# Patient Record
Sex: FEMALE | Race: WHITE | NOT HISPANIC OR LATINO | Employment: OTHER | URBAN - METROPOLITAN AREA
[De-identification: names, ages, dates, MRNs, and addresses within clinical notes are randomized per-mention and may not be internally consistent; named-entity substitution may affect disease eponyms.]

---

## 2023-06-25 DIAGNOSIS — I15.9 SECONDARY HYPERTENSION, UNSPECIFIED: ICD-10-CM

## 2023-06-26 ENCOUNTER — OFFICE VISIT (OUTPATIENT)
Dept: PRIMARY CARE | Facility: CLINIC | Age: 68
End: 2023-06-26
Payer: MEDICARE

## 2023-06-26 VITALS
WEIGHT: 161.4 LBS | BODY MASS INDEX: 30.5 KG/M2 | DIASTOLIC BLOOD PRESSURE: 80 MMHG | HEART RATE: 64 BPM | OXYGEN SATURATION: 99 % | SYSTOLIC BLOOD PRESSURE: 160 MMHG

## 2023-06-26 DIAGNOSIS — Z13.0 SCREENING FOR DEFICIENCY ANEMIA: ICD-10-CM

## 2023-06-26 DIAGNOSIS — L50.0 ALLERGIC URTICARIA: ICD-10-CM

## 2023-06-26 DIAGNOSIS — E55.9 VITAMIN D DEFICIENCY: ICD-10-CM

## 2023-06-26 DIAGNOSIS — Z23 IMMUNIZATION DUE: ICD-10-CM

## 2023-06-26 DIAGNOSIS — Z00.00 WELLNESS EXAMINATION: Primary | ICD-10-CM

## 2023-06-26 DIAGNOSIS — Z13.6 ENCOUNTER FOR SCREENING FOR CARDIOVASCULAR DISORDERS: ICD-10-CM

## 2023-06-26 DIAGNOSIS — I15.9 BENIGN SECONDARY HYPERTENSION: ICD-10-CM

## 2023-06-26 DIAGNOSIS — Z13.29 SCREENING FOR THYROID DISORDER: ICD-10-CM

## 2023-06-26 DIAGNOSIS — C02.9 TONGUE CANCER (MULTI): ICD-10-CM

## 2023-06-26 DIAGNOSIS — E03.9 ADULT HYPOTHYROIDISM: ICD-10-CM

## 2023-06-26 PROBLEM — L25.5 CONTACT DERMATITIS DUE TO PLANT: Status: ACTIVE | Noted: 2023-06-26

## 2023-06-26 PROBLEM — R06.02 EXERTIONAL SHORTNESS OF BREATH: Status: RESOLVED | Noted: 2023-06-26 | Resolved: 2023-06-26

## 2023-06-26 PROBLEM — K14.8 MASS OF TONGUE: Status: ACTIVE | Noted: 2023-06-26

## 2023-06-26 PROBLEM — K14.8 MASS OF TONGUE: Status: RESOLVED | Noted: 2023-06-26 | Resolved: 2023-06-26

## 2023-06-26 PROBLEM — R06.02 EXERTIONAL SHORTNESS OF BREATH: Status: ACTIVE | Noted: 2023-06-26

## 2023-06-26 PROBLEM — R42 LIGHTHEADEDNESS: Status: ACTIVE | Noted: 2023-06-26

## 2023-06-26 PROBLEM — R21 RASH: Status: ACTIVE | Noted: 2023-06-26

## 2023-06-26 PROBLEM — R21 RASH: Status: RESOLVED | Noted: 2023-06-26 | Resolved: 2023-06-26

## 2023-06-26 PROBLEM — R42 LIGHTHEADEDNESS: Status: RESOLVED | Noted: 2023-06-26 | Resolved: 2023-06-26

## 2023-06-26 PROBLEM — M72.2 PLANTAR FASCIITIS: Status: ACTIVE | Noted: 2023-06-26

## 2023-06-26 LAB
ALANINE AMINOTRANSFERASE (SGPT) (U/L) IN SER/PLAS: 7 U/L (ref 7–45)
ALBUMIN (G/DL) IN SER/PLAS: 4.3 G/DL (ref 3.4–5)
ALKALINE PHOSPHATASE (U/L) IN SER/PLAS: 61 U/L (ref 33–136)
ANION GAP IN SER/PLAS: 14 MMOL/L (ref 10–20)
ASPARTATE AMINOTRANSFERASE (SGOT) (U/L) IN SER/PLAS: 11 U/L (ref 9–39)
BASOPHILS (10*3/UL) IN BLOOD BY AUTOMATED COUNT: 0.03 X10E9/L (ref 0–0.1)
BASOPHILS/100 LEUKOCYTES IN BLOOD BY AUTOMATED COUNT: 0.6 % (ref 0–2)
BILIRUBIN TOTAL (MG/DL) IN SER/PLAS: 0.7 MG/DL (ref 0–1.2)
CALCIDIOL (25 OH VITAMIN D3) (NG/ML) IN SER/PLAS: 17 NG/ML
CALCIUM (MG/DL) IN SER/PLAS: 9.8 MG/DL (ref 8.6–10.6)
CARBON DIOXIDE, TOTAL (MMOL/L) IN SER/PLAS: 27 MMOL/L (ref 21–32)
CHLORIDE (MMOL/L) IN SER/PLAS: 105 MMOL/L (ref 98–107)
CHOLESTEROL (MG/DL) IN SER/PLAS: 174 MG/DL (ref 0–199)
CHOLESTEROL IN HDL (MG/DL) IN SER/PLAS: 53.3 MG/DL
CHOLESTEROL/HDL RATIO: 3.3
COBALAMIN (VITAMIN B12) (PG/ML) IN SER/PLAS: 472 PG/ML (ref 211–911)
CREATININE (MG/DL) IN SER/PLAS: 0.69 MG/DL (ref 0.5–1.05)
EOSINOPHILS (10*3/UL) IN BLOOD BY AUTOMATED COUNT: 0.27 X10E9/L (ref 0–0.7)
EOSINOPHILS/100 LEUKOCYTES IN BLOOD BY AUTOMATED COUNT: 5.7 % (ref 0–6)
ERYTHROCYTE DISTRIBUTION WIDTH (RATIO) BY AUTOMATED COUNT: 14.4 % (ref 11.5–14.5)
ERYTHROCYTE MEAN CORPUSCULAR HEMOGLOBIN CONCENTRATION (G/DL) BY AUTOMATED: 31.4 G/DL (ref 32–36)
ERYTHROCYTE MEAN CORPUSCULAR VOLUME (FL) BY AUTOMATED COUNT: 88 FL (ref 80–100)
ERYTHROCYTES (10*6/UL) IN BLOOD BY AUTOMATED COUNT: 4.95 X10E12/L (ref 4–5.2)
GFR FEMALE: >90 ML/MIN/1.73M2
GLUCOSE (MG/DL) IN SER/PLAS: 87 MG/DL (ref 74–99)
HEMATOCRIT (%) IN BLOOD BY AUTOMATED COUNT: 43.6 % (ref 36–46)
HEMOGLOBIN (G/DL) IN BLOOD: 13.7 G/DL (ref 12–16)
IMMATURE GRANULOCYTES/100 LEUKOCYTES IN BLOOD BY AUTOMATED COUNT: 0.2 % (ref 0–0.9)
LDL: 105 MG/DL (ref 0–99)
LEUKOCYTES (10*3/UL) IN BLOOD BY AUTOMATED COUNT: 4.7 X10E9/L (ref 4.4–11.3)
LYMPHOCYTES (10*3/UL) IN BLOOD BY AUTOMATED COUNT: 1.28 X10E9/L (ref 1.2–4.8)
LYMPHOCYTES/100 LEUKOCYTES IN BLOOD BY AUTOMATED COUNT: 27.1 % (ref 13–44)
MONOCYTES (10*3/UL) IN BLOOD BY AUTOMATED COUNT: 0.5 X10E9/L (ref 0.1–1)
MONOCYTES/100 LEUKOCYTES IN BLOOD BY AUTOMATED COUNT: 10.6 % (ref 2–10)
NEUTROPHILS (10*3/UL) IN BLOOD BY AUTOMATED COUNT: 2.63 X10E9/L (ref 1.2–7.7)
NEUTROPHILS/100 LEUKOCYTES IN BLOOD BY AUTOMATED COUNT: 55.8 % (ref 40–80)
NRBC (PER 100 WBCS) BY AUTOMATED COUNT: 0 /100 WBC (ref 0–0)
PLATELETS (10*3/UL) IN BLOOD AUTOMATED COUNT: 172 X10E9/L (ref 150–450)
POTASSIUM (MMOL/L) IN SER/PLAS: 4.3 MMOL/L (ref 3.5–5.3)
PROTEIN TOTAL: 6.9 G/DL (ref 6.4–8.2)
SODIUM (MMOL/L) IN SER/PLAS: 142 MMOL/L (ref 136–145)
THYROTROPIN (MIU/L) IN SER/PLAS BY DETECTION LIMIT <= 0.05 MIU/L: 0.04 MIU/L (ref 0.44–3.98)
THYROXINE (T4) FREE (NG/DL) IN SER/PLAS: 1.32 NG/DL (ref 0.78–1.48)
TRIGLYCERIDE (MG/DL) IN SER/PLAS: 78 MG/DL (ref 0–149)
UREA NITROGEN (MG/DL) IN SER/PLAS: 14 MG/DL (ref 6–23)
VLDL: 16 MG/DL (ref 0–40)

## 2023-06-26 PROCEDURE — 1170F FXNL STATUS ASSESSED: CPT | Performed by: STUDENT IN AN ORGANIZED HEALTH CARE EDUCATION/TRAINING PROGRAM

## 2023-06-26 PROCEDURE — 80061 LIPID PANEL: CPT

## 2023-06-26 PROCEDURE — 82607 VITAMIN B-12: CPT

## 2023-06-26 PROCEDURE — G0009 ADMIN PNEUMOCOCCAL VACCINE: HCPCS | Performed by: STUDENT IN AN ORGANIZED HEALTH CARE EDUCATION/TRAINING PROGRAM

## 2023-06-26 PROCEDURE — 3077F SYST BP >= 140 MM HG: CPT | Performed by: STUDENT IN AN ORGANIZED HEALTH CARE EDUCATION/TRAINING PROGRAM

## 2023-06-26 PROCEDURE — 85025 COMPLETE CBC W/AUTO DIFF WBC: CPT

## 2023-06-26 PROCEDURE — 84439 ASSAY OF FREE THYROXINE: CPT

## 2023-06-26 PROCEDURE — 1159F MED LIST DOCD IN RCRD: CPT | Performed by: STUDENT IN AN ORGANIZED HEALTH CARE EDUCATION/TRAINING PROGRAM

## 2023-06-26 PROCEDURE — 1160F RVW MEDS BY RX/DR IN RCRD: CPT | Performed by: STUDENT IN AN ORGANIZED HEALTH CARE EDUCATION/TRAINING PROGRAM

## 2023-06-26 PROCEDURE — 82306 VITAMIN D 25 HYDROXY: CPT

## 2023-06-26 PROCEDURE — 1036F TOBACCO NON-USER: CPT | Performed by: STUDENT IN AN ORGANIZED HEALTH CARE EDUCATION/TRAINING PROGRAM

## 2023-06-26 PROCEDURE — 99214 OFFICE O/P EST MOD 30 MIN: CPT | Performed by: STUDENT IN AN ORGANIZED HEALTH CARE EDUCATION/TRAINING PROGRAM

## 2023-06-26 PROCEDURE — 80053 COMPREHEN METABOLIC PANEL: CPT

## 2023-06-26 PROCEDURE — 90677 PCV20 VACCINE IM: CPT | Performed by: STUDENT IN AN ORGANIZED HEALTH CARE EDUCATION/TRAINING PROGRAM

## 2023-06-26 PROCEDURE — G0439 PPPS, SUBSEQ VISIT: HCPCS | Performed by: STUDENT IN AN ORGANIZED HEALTH CARE EDUCATION/TRAINING PROGRAM

## 2023-06-26 PROCEDURE — 3079F DIAST BP 80-89 MM HG: CPT | Performed by: STUDENT IN AN ORGANIZED HEALTH CARE EDUCATION/TRAINING PROGRAM

## 2023-06-26 PROCEDURE — 84443 ASSAY THYROID STIM HORMONE: CPT

## 2023-06-26 RX ORDER — METHYLPREDNISOLONE 4 MG/1
TABLET ORAL
COMMUNITY
Start: 2022-07-06 | End: 2023-06-26 | Stop reason: ALTCHOICE

## 2023-06-26 RX ORDER — LEVOTHYROXINE SODIUM 100 UG/1
100 TABLET ORAL DAILY
Qty: 90 TABLET | Refills: 3 | Status: SHIPPED | OUTPATIENT
Start: 2023-06-26 | End: 2024-06-25

## 2023-06-26 RX ORDER — VALSARTAN 160 MG/1
1 TABLET ORAL DAILY
COMMUNITY
Start: 2022-07-06 | End: 2023-06-26 | Stop reason: ALTCHOICE

## 2023-06-26 RX ORDER — VALSARTAN 160 MG/1
TABLET ORAL
Qty: 90 TABLET | Refills: 1 | Status: SHIPPED | OUTPATIENT
Start: 2023-06-26 | End: 2023-06-26 | Stop reason: ALTCHOICE

## 2023-06-26 RX ORDER — PREDNISONE 10 MG/1
TABLET ORAL
COMMUNITY
Start: 2023-06-01 | End: 2023-06-26 | Stop reason: ALTCHOICE

## 2023-06-26 RX ORDER — VALSARTAN AND HYDROCHLOROTHIAZIDE 160; 12.5 MG/1; MG/1
1 TABLET, FILM COATED ORAL DAILY
Qty: 90 TABLET | Refills: 3 | Status: SHIPPED | OUTPATIENT
Start: 2023-06-26 | End: 2024-06-25

## 2023-06-26 RX ORDER — LEVOTHYROXINE SODIUM 100 UG/1
100 TABLET ORAL DAILY
COMMUNITY
End: 2023-06-26 | Stop reason: SDUPTHER

## 2023-06-26 RX ORDER — SODIUM CHLORIDE 0.9 G/100ML
1 IRRIGANT IRRIGATION
COMMUNITY
Start: 2021-06-02 | End: 2023-06-26 | Stop reason: ALTCHOICE

## 2023-06-26 ASSESSMENT — PATIENT HEALTH QUESTIONNAIRE - PHQ9
SUM OF ALL RESPONSES TO PHQ9 QUESTIONS 1 AND 2: 0
2. FEELING DOWN, DEPRESSED OR HOPELESS: NOT AT ALL
1. LITTLE INTEREST OR PLEASURE IN DOING THINGS: NOT AT ALL

## 2023-06-26 ASSESSMENT — ACTIVITIES OF DAILY LIVING (ADL)
MANAGING_FINANCES: INDEPENDENT
TAKING_MEDICATION: INDEPENDENT
BATHING: INDEPENDENT
GROCERY_SHOPPING: INDEPENDENT
DRESSING: INDEPENDENT
DOING_HOUSEWORK: INDEPENDENT

## 2023-06-26 ASSESSMENT — ENCOUNTER SYMPTOMS
LOSS OF SENSATION IN FEET: 0
OCCASIONAL FEELINGS OF UNSTEADINESS: 0
DEPRESSION: 0

## 2023-06-26 ASSESSMENT — PAIN SCALES - GENERAL: PAINLEVEL: 0-NO PAIN

## 2023-06-26 NOTE — PATIENT INSTRUCTIONS
1.  Medicare wellness visit.  No concerns on exam.  Screening labs ordered today.  We will repeat colonoscopy and mammogram in 2024.  Prevnar 20 given today, no further pneumonia shots needed.    2.  Hypertension.  She has had elevated readings at home.  We will adjust medication to valsartan/hydrochlorothiazide 160/12.5 mg once per day continue to monitor home blood pressures.  Notify us in the next 2 to 3 months if your average readings are above 140/90.    3.  Hypothyroidism repeat labs today continue current dose we will adjust if needed based on lab work.    4.  History of tongue cancer.  Continue routine follow-ups with ENT specialist every 4 months.

## 2023-06-26 NOTE — PROGRESS NOTES
Subjective   Patient ID: Kalli Pandey is a 68 y.o. female who presents for Medicare Annual Wellness Visit Initial (She is fasting.) and Med Refill.    HPI comes in for Medicare wellness visit    Review of Systems  Constitutional: NO F, chills, or sweats  Eyes: no blurred vision or visual disturbance  ENT: no hearing loss, no congestion, no nasal discharge, no hoarseness and no sore throat.   Cardiovascular: no chest pain, no edema, no palps and no syncope.,  Blood pressure readings are elevated at home  Respiratory: no cough,no s.o.b. and no wheezing  Gastrointestinal: no abdominal pain, No C/D no N/V, no blood in stools  Genitourinary: no dysuria, no change in urinary frequency, no urinary hesitancy and no feelings of urinary urgency.   Musculoskeletal: no arthralgias,  no back pain and no myalgias.   Integumentary: no new skin lesions and no rashes.   Neurological: no difficulty walking, no headache, no limb weakness, no numbness and no tingling.   Psychiatric: no anxiety, no depression, no anhedonia and no substance use disorders.   Endocrine: no recent weight gain and no recent weight loss.   Hematologic/Lymphatic: no tendency for easy bruising and no swollen glands.  Objective   /80 (BP Location: Right arm, Patient Position: Sitting, BP Cuff Size: Large adult)   Pulse 64   Wt 73.2 kg (161 lb 6.4 oz)   SpO2 99%   BMI 30.50 kg/m²     Physical Exam  gen- a & o x 3, nad, pleasant  heent- eomi, perrla, ear canals patent, TM's non-erythematous, no fluid, frontal and maxillary sinus's nontender  neck- supple, nontender, no palpable or enlarged nodes, no thyromegaly  heart- rrr, no murmurs  lungs- cta b/l , no w/r/r  chest- symmetric, nontender  ab- soft, nontender, no palpable organomegaly, postive bowel sounds  ex's- no c/c/e  neuro- CNs 2-12 grossly intact, full sensation and strength in all extremities    Assessment/Plan     1.  Medicare wellness visit.  No concerns on exam.  Screening labs ordered  today.  We will repeat colonoscopy and mammogram in 2024.  Prevnar 20 given today, no further pneumonia shots needed.    2.  Hypertension.  She has had elevated readings at home.  We will adjust medication to valsartan/hydrochlorothiazide 160/12.5 mg once per day continue to monitor home blood pressures.  Notify us in the next 2 to 3 months if your average readings are above 140/90.    3.  Hypothyroidism repeat labs today continue current dose we will adjust if needed based on lab work.    4.  History of tongue cancer.  Continue routine follow-ups with ENT specialist every 4 months.

## 2023-12-11 ENCOUNTER — OFFICE VISIT (OUTPATIENT)
Dept: OTOLARYNGOLOGY | Facility: CLINIC | Age: 68
End: 2023-12-11
Payer: MEDICARE

## 2023-12-11 VITALS
HEART RATE: 85 BPM | BODY MASS INDEX: 29.83 KG/M2 | WEIGHT: 158 LBS | OXYGEN SATURATION: 95 % | HEIGHT: 61 IN | DIASTOLIC BLOOD PRESSURE: 86 MMHG | TEMPERATURE: 97.3 F | RESPIRATION RATE: 16 BRPM | SYSTOLIC BLOOD PRESSURE: 139 MMHG

## 2023-12-11 DIAGNOSIS — C02.9 TONGUE CANCER (MULTI): Primary | ICD-10-CM

## 2023-12-11 PROCEDURE — 88305 TISSUE EXAM BY PATHOLOGIST: CPT | Mod: TC,SUR | Performed by: OTOLARYNGOLOGY

## 2023-12-11 PROCEDURE — 1160F RVW MEDS BY RX/DR IN RCRD: CPT | Performed by: OTOLARYNGOLOGY

## 2023-12-11 PROCEDURE — 88312 SPECIAL STAINS GROUP 1: CPT | Performed by: DENTIST

## 2023-12-11 PROCEDURE — 1159F MED LIST DOCD IN RCRD: CPT | Performed by: OTOLARYNGOLOGY

## 2023-12-11 PROCEDURE — 88305 TISSUE EXAM BY PATHOLOGIST: CPT | Performed by: DENTIST

## 2023-12-11 PROCEDURE — 41100 BIOPSY OF TONGUE: CPT | Performed by: OTOLARYNGOLOGY

## 2023-12-11 PROCEDURE — 99213 OFFICE O/P EST LOW 20 MIN: CPT | Performed by: OTOLARYNGOLOGY

## 2023-12-11 PROCEDURE — 3075F SYST BP GE 130 - 139MM HG: CPT | Performed by: OTOLARYNGOLOGY

## 2023-12-11 PROCEDURE — 1036F TOBACCO NON-USER: CPT | Performed by: OTOLARYNGOLOGY

## 2023-12-11 PROCEDURE — 3079F DIAST BP 80-89 MM HG: CPT | Performed by: OTOLARYNGOLOGY

## 2023-12-11 PROCEDURE — 1126F AMNT PAIN NOTED NONE PRSNT: CPT | Performed by: OTOLARYNGOLOGY

## 2023-12-11 ASSESSMENT — PAIN SCALES - GENERAL: PAINLEVEL: 0-NO PAIN

## 2023-12-11 NOTE — PROGRESS NOTES
Otolaryngology Head and Neck Surgery Clinic Follow-up Note    CC: tongue cancer    Subjective:  Kalli Pandey is a 68 y.o. female who presents to clinic today for follow up of her [id number] left posterolateral tongue scca.     path - 3 cm, 16 mm DOI, negative margin, closest at 8 mm deep; 2 lymph nodes pos without ECS  completed adjuvant radiation.        66 yo woman with 3 month history of sore on the left side of the tongue. She first noticed it back in February. There's some discomfort there and she has to avoid it while eating. No bleeding, doesn't report gaviota pain. Seems to be persistent / enlarged since then. She never recalled a non healing ulcer or sore in that spot. denies smoking or drinking. No heart problems, no blood thinners.     5-12-21 FU: had CT scan, PAT today; no new complaints  5-19-21: OR s/p triple, trach, left hemiglossectomy, left neck dissection 1-4, recon with left radial forearm free flap.  6-8-21 FU: doing well at home, breathing comfortably, occasional blood tinged mucous and scabs with suctioning. minimal pain  6-22-21 FU: doing well with capped trach during day, feels a little tight at night. tolerating tube feeds, no shortness of breath, no mucous plugs, no significant pain, tongue swelling improved  7-13-21 fu: has had trach capped for past 2 weeks, no issues. to start radiation tomorrow with Dr. Pitt  9-14-21 FU: completed radiation 8/26. pain and mucositis finally starting to resolve. tolerating some food now but still using feeding tube. no trouble breathing.  10-19-21 FU: no longer using feeding tube, no more pain  11-15-21 FU: sometimes has some burning when drinking otherwise doing well  1-4-22 FU: no new complaints  3-1-22 FU: no new complaints  5-3-22 FU: no new complaints  8-9-22 FU: no complaints  11-29-22 FU: no issues  4-4-23 FU: no new complaints  8-28-23 FU: had lower teeth removed, has full lower dentures now, no trouble eating/drinking. denture seems to be  "seating well   12-11-23 FU:  no new complaints.           Physical Exam:  Vitals: /86   Pulse 85   Temp 36.3 °C (97.3 °F) (Temporal)   Resp 16   Ht 1.549 m (5' 1\")   Wt 71.7 kg (158 lb)   SpO2 95%   BMI 29.85 kg/m²   GENERAL: Appears well developed, well nourished  RESPIRATION: Breathing comfortably on room air, no stridor  CV: No clubbing/cyanosis/edema in hands  EYES: EOM Intact, sclera normal  NEURO: AAOx3, Cranial nerves 2-12 intact and symmetric bilaterally  HEAD AND FACE: Skin with no masses or lesions, sinuses nontender to palpation  SALIVARY GLANDS: Parotid and submandibular glands normal bilaterally  EARS: Normal external ears, external auditory canals, and TMs to otoscopy, normal hearing to whispered voice  NOSE: External nose midline, anterior rhinoscopy is normal with limited visualization to the anterior aspect of the interior turbinates, no lesions noted  ORAL CAVITY/OROPHARYNX/LIPS: Normal mucous membranes, normal floor of mouth/tongue/OP, papular/warty appearing lesions of the left posterior lateral tongue near the reconstruction.  PHARYNGEAL WALLS AND NASOPHARYNX: No masses noted  NECK/LYMPH: No LAD, no thyroid masses      Procedure note: Intraoral biopsy  Verbal consent was obtained from the patient. 1cc of 1% lidocaine with epineprhine was infiltrated in the left posterior lateral tongue.  15 blade was used to excise a small piece of the lesion.  This was then placed in formalin and sent for permanent pathology.  Hemostasis was achieved with pressure and silver nitrate cautery.  Patient tolerated procedure well.    Labs:  TSH/T4 (6/26/23)  TSH 0.04 (low), free T4 1.32 (normal)    Assessment and Plan:  Kalli Pandey is a 68 y.o. female who presents to ENT clinic today for cancer surveillance of her [id number] Scca of the posteriorlateral tongue.    - will call for biopsy results  - RTC in 3 months, will order chest xr at that time for surveillance     Mic Tellez MD " PGY5  Otolaryngology - Head & Neck Surgery  [facility]  ENT Consult Pager: [phone]; Head and Neck team [phone]

## 2023-12-18 LAB
LABORATORY COMMENT REPORT: NORMAL
PATH REPORT.FINAL DX SPEC: NORMAL
PATH REPORT.GROSS SPEC: NORMAL
PATH REPORT.RELEVANT HX SPEC: NORMAL
PATH REPORT.TOTAL CANCER: NORMAL

## 2023-12-18 NOTE — RESULT ENCOUNTER NOTE
Negative biopsy, no cancer. Some fungal elements and thickening  Cornelius can you send over some nystvalencia davidson and spit for her thanks.

## 2023-12-20 DIAGNOSIS — B37.0 THRUSH: Primary | ICD-10-CM

## 2023-12-20 RX ORDER — NYSTATIN 100000 [USP'U]/ML
500000 SUSPENSION ORAL
Qty: 150 ML | Refills: 0 | Status: SHIPPED | OUTPATIENT
Start: 2023-12-20 | End: 2023-12-30

## 2024-03-11 ENCOUNTER — OFFICE VISIT (OUTPATIENT)
Dept: OTOLARYNGOLOGY | Facility: CLINIC | Age: 69
End: 2024-03-11
Payer: MEDICARE

## 2024-03-11 VITALS
TEMPERATURE: 98.1 F | DIASTOLIC BLOOD PRESSURE: 77 MMHG | SYSTOLIC BLOOD PRESSURE: 122 MMHG | WEIGHT: 163 LBS | RESPIRATION RATE: 16 BRPM | HEIGHT: 61 IN | HEART RATE: 70 BPM | OXYGEN SATURATION: 99 % | BODY MASS INDEX: 30.78 KG/M2

## 2024-03-11 DIAGNOSIS — C02.9 TONGUE CANCER (MULTI): Primary | ICD-10-CM

## 2024-03-11 PROCEDURE — 3078F DIAST BP <80 MM HG: CPT | Performed by: OTOLARYNGOLOGY

## 2024-03-11 PROCEDURE — 99213 OFFICE O/P EST LOW 20 MIN: CPT | Performed by: OTOLARYNGOLOGY

## 2024-03-11 PROCEDURE — 1126F AMNT PAIN NOTED NONE PRSNT: CPT | Performed by: OTOLARYNGOLOGY

## 2024-03-11 PROCEDURE — 3074F SYST BP LT 130 MM HG: CPT | Performed by: OTOLARYNGOLOGY

## 2024-03-11 PROCEDURE — 1159F MED LIST DOCD IN RCRD: CPT | Performed by: OTOLARYNGOLOGY

## 2024-03-11 PROCEDURE — 1036F TOBACCO NON-USER: CPT | Performed by: OTOLARYNGOLOGY

## 2024-03-11 ASSESSMENT — PAIN SCALES - GENERAL: PAINLEVEL: 0-NO PAIN

## 2024-03-11 NOTE — PROGRESS NOTES
Otolaryngology Head and Neck Surgery Clinic Follow-up Note    CC: tongue cancer    Subjective:  Kalli Pandey is a 68 y.o. female who presents to clinic today for follow up of her [id number] left posterolateral tongue scca.     path - 3 cm, 16 mm DOI, negative margin, closest at 8 mm deep; 2 lymph nodes pos without ECS  completed adjuvant radiation.        64 yo woman with 3 month history of sore on the left side of the tongue. She first noticed it back in February. There's some discomfort there and she has to avoid it while eating. No bleeding, doesn't report gaviota pain. Seems to be persistent / enlarged since then. She never recalled a non healing ulcer or sore in that spot. denies smoking or drinking. No heart problems, no blood thinners.     5-12-21 FU: had CT scan, PAT today; no new complaints  5-19-21: OR s/p triple, trach, left hemiglossectomy, left neck dissection 1-4, recon with left radial forearm free flap.  6-8-21 FU: doing well at home, breathing comfortably, occasional blood tinged mucous and scabs with suctioning. minimal pain  6-22-21 FU: doing well with capped trach during day, feels a little tight at night. tolerating tube feeds, no shortness of breath, no mucous plugs, no significant pain, tongue swelling improved  7-13-21 fu: has had trach capped for past 2 weeks, no issues. to start radiation tomorrow with Dr. Pitt  9-14-21 FU: completed radiation 8/26. pain and mucositis finally starting to resolve. tolerating some food now but still using feeding tube. no trouble breathing.  10-19-21 FU: no longer using feeding tube, no more pain  11-15-21 FU: sometimes has some burning when drinking otherwise doing well  1-4-22 FU: no new complaints  3-1-22 FU: no new complaints  5-3-22 FU: no new complaints  8-9-22 FU: no complaints  11-29-22 FU: no issues  4-4-23 FU: no new complaints  8-28-23 FU: had lower teeth removed, has full lower dentures now, no trouble eating/drinking. denture seems to be  "seating well   12-11-23 FU:  no new complaints.   3-11-24 FU: no new complaints    Physical Exam:  Vitals: /77   Pulse 70   Temp 36.7 °C (98.1 °F) (Temporal)   Resp 16   Ht 1.549 m (5' 1\")   Wt 73.9 kg (163 lb)   SpO2 99%   BMI 30.80 kg/m²   GENERAL: Appears well developed, well nourished  RESPIRATION: Breathing comfortably on room air, no stridor  CV: No clubbing/cyanosis/edema in hands  EYES: EOM Intact, sclera normal  NEURO: AAOx3, Cranial nerves 2-12 intact and symmetric bilaterally  HEAD AND FACE: Skin with no masses or lesions, sinuses nontender to palpation  SALIVARY GLANDS: Parotid and submandibular glands normal bilaterally  EARS: Normal external ears, external auditory canals, and TMs to otoscopy, normal hearing to whispered voice  NOSE: External nose midline, anterior rhinoscopy is normal with limited visualization to the anterior aspect of the interior turbinates, no lesions noted  ORAL CAVITY/OROPHARYNX/LIPS: Normal mucous membranes, normal floor of mouth/tongue/OP, papular/warty appearing lesions of the left posterior lateral tongue/ RMT where posterior aspect of dentures is rubbing  PHARYNGEAL WALLS AND NASOPHARYNX: No masses noted  NECK/LYMPH: No LAD, no thyroid masses    Labs:  TSH/T4 (6/26/23)  TSH 0.04 (low), free T4 1.32 (normal)    Assessment and Plan:  Kalli Pandey is a 68 y.o. female who presents to ENT clinic today for cancer surveillance of her [id number] Scca of the posteriorlateral tongue.    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

## 2024-04-10 ENCOUNTER — HOSPITAL ENCOUNTER (OUTPATIENT)
Dept: RADIOLOGY | Facility: HOSPITAL | Age: 69
Discharge: HOME | End: 2024-04-10
Payer: MEDICARE

## 2024-04-10 ENCOUNTER — APPOINTMENT (OUTPATIENT)
Dept: RADIOLOGY | Facility: CLINIC | Age: 69
End: 2024-04-10
Payer: MEDICARE

## 2024-04-10 DIAGNOSIS — C02.9 TONGUE CANCER (MULTI): ICD-10-CM

## 2024-04-10 PROCEDURE — 71046 X-RAY EXAM CHEST 2 VIEWS: CPT

## 2024-04-10 PROCEDURE — 71046 X-RAY EXAM CHEST 2 VIEWS: CPT | Performed by: RADIOLOGY

## 2024-06-13 DIAGNOSIS — I15.9 BENIGN SECONDARY HYPERTENSION: ICD-10-CM

## 2024-06-13 DIAGNOSIS — E03.9 ADULT HYPOTHYROIDISM: ICD-10-CM

## 2024-06-13 PROBLEM — I95.9 HYPOTENSION: Status: ACTIVE | Noted: 2024-06-13

## 2024-06-13 PROBLEM — R09.02 HYPOXIA: Status: ACTIVE | Noted: 2024-06-13

## 2024-06-13 PROBLEM — J98.11 PULMONARY ATELECTASIS: Status: ACTIVE | Noted: 2024-06-13

## 2024-06-13 PROBLEM — E86.1 ABSOLUTE HYPOVOLEMIA: Status: ACTIVE | Noted: 2024-06-13

## 2024-06-13 RX ORDER — VALSARTAN AND HYDROCHLOROTHIAZIDE 160; 12.5 MG/1; MG/1
1 TABLET, FILM COATED ORAL DAILY
Qty: 90 TABLET | Refills: 3 | Status: SHIPPED | OUTPATIENT
Start: 2024-06-13

## 2024-06-13 RX ORDER — AMLODIPINE, VALSARTAN AND HYDROCHLOROTHIAZIDE 5; 160; 25 MG/1; MG/1; MG/1
TABLET ORAL
COMMUNITY
Start: 2016-03-24

## 2024-06-13 RX ORDER — LEVOTHYROXINE SODIUM 100 UG/1
100 TABLET ORAL DAILY
Qty: 90 TABLET | Refills: 3 | Status: SHIPPED | OUTPATIENT
Start: 2024-06-13

## 2024-07-15 ENCOUNTER — APPOINTMENT (OUTPATIENT)
Dept: OTOLARYNGOLOGY | Facility: CLINIC | Age: 69
End: 2024-07-15
Payer: MEDICARE

## 2024-07-22 ENCOUNTER — APPOINTMENT (OUTPATIENT)
Dept: OTOLARYNGOLOGY | Facility: CLINIC | Age: 69
End: 2024-07-22
Payer: MEDICARE

## 2024-07-22 VITALS
BODY MASS INDEX: 30.8 KG/M2 | RESPIRATION RATE: 16 BRPM | SYSTOLIC BLOOD PRESSURE: 142 MMHG | HEART RATE: 67 BPM | TEMPERATURE: 97.9 F | WEIGHT: 163.13 LBS | OXYGEN SATURATION: 100 % | HEIGHT: 61 IN | DIASTOLIC BLOOD PRESSURE: 82 MMHG

## 2024-07-22 DIAGNOSIS — Z85.810 HISTORY OF TONGUE CANCER: Primary | ICD-10-CM

## 2024-07-22 PROCEDURE — 99213 OFFICE O/P EST LOW 20 MIN: CPT | Performed by: OTOLARYNGOLOGY

## 2024-07-22 PROCEDURE — 1126F AMNT PAIN NOTED NONE PRSNT: CPT | Performed by: OTOLARYNGOLOGY

## 2024-07-22 PROCEDURE — 3077F SYST BP >= 140 MM HG: CPT | Performed by: OTOLARYNGOLOGY

## 2024-07-22 PROCEDURE — 1159F MED LIST DOCD IN RCRD: CPT | Performed by: OTOLARYNGOLOGY

## 2024-07-22 PROCEDURE — 3008F BODY MASS INDEX DOCD: CPT | Performed by: OTOLARYNGOLOGY

## 2024-07-22 PROCEDURE — 3079F DIAST BP 80-89 MM HG: CPT | Performed by: OTOLARYNGOLOGY

## 2024-07-22 PROCEDURE — 1160F RVW MEDS BY RX/DR IN RCRD: CPT | Performed by: OTOLARYNGOLOGY

## 2024-07-22 PROCEDURE — 1036F TOBACCO NON-USER: CPT | Performed by: OTOLARYNGOLOGY

## 2024-07-22 SDOH — ECONOMIC STABILITY: FOOD INSECURITY: WITHIN THE PAST 12 MONTHS, YOU WORRIED THAT YOUR FOOD WOULD RUN OUT BEFORE YOU GOT MONEY TO BUY MORE.: NEVER TRUE

## 2024-07-22 SDOH — ECONOMIC STABILITY: FOOD INSECURITY: WITHIN THE PAST 12 MONTHS, THE FOOD YOU BOUGHT JUST DIDN'T LAST AND YOU DIDN'T HAVE MONEY TO GET MORE.: NEVER TRUE

## 2024-07-22 ASSESSMENT — ENCOUNTER SYMPTOMS
OCCASIONAL FEELINGS OF UNSTEADINESS: 0
DEPRESSION: 0
LOSS OF SENSATION IN FEET: 0

## 2024-07-22 ASSESSMENT — PATIENT HEALTH QUESTIONNAIRE - PHQ9
1. LITTLE INTEREST OR PLEASURE IN DOING THINGS: NOT AT ALL
SUM OF ALL RESPONSES TO PHQ9 QUESTIONS 1 AND 2: 0
2. FEELING DOWN, DEPRESSED OR HOPELESS: NOT AT ALL

## 2024-07-22 ASSESSMENT — LIFESTYLE VARIABLES
HOW OFTEN DO YOU HAVE A DRINK CONTAINING ALCOHOL: NEVER
HOW OFTEN DO YOU HAVE SIX OR MORE DRINKS ON ONE OCCASION: NEVER
AUDIT-C TOTAL SCORE: 0
SKIP TO QUESTIONS 9-10: 1
HOW MANY STANDARD DRINKS CONTAINING ALCOHOL DO YOU HAVE ON A TYPICAL DAY: PATIENT DOES NOT DRINK

## 2024-07-22 ASSESSMENT — PAIN SCALES - GENERAL: PAINLEVEL: 0-NO PAIN

## 2024-07-22 NOTE — PROGRESS NOTES
Otolaryngology Head and Neck Surgery Clinic Follow-up Note    CC: tongue cancer    Subjective:  Kalli Pandey is a 69 y.o. female who presents to clinic today for follow up of her [id number] left posterolateral tongue scca.     path - 3 cm, 16 mm DOI, negative margin, closest at 8 mm deep; 2 lymph nodes pos without ECS  completed adjuvant radiation.        64 yo woman with 3 month history of sore on the left side of the tongue. She first noticed it back in February. There's some discomfort there and she has to avoid it while eating. No bleeding, doesn't report gaviota pain. Seems to be persistent / enlarged since then. She never recalled a non healing ulcer or sore in that spot. denies smoking or drinking. No heart problems, no blood thinners.     5-12-21 FU: had CT scan, PAT today; no new complaints  5-19-21: OR s/p triple, trach, left hemiglossectomy, left neck dissection 1-4, recon with left radial forearm free flap.  6-8-21 FU: doing well at home, breathing comfortably, occasional blood tinged mucous and scabs with suctioning. minimal pain  6-22-21 FU: doing well with capped trach during day, feels a little tight at night. tolerating tube feeds, no shortness of breath, no mucous plugs, no significant pain, tongue swelling improved  7-13-21 fu: has had trach capped for past 2 weeks, no issues. to start radiation tomorrow with Dr. Pitt  9-14-21 FU: completed radiation 8/26. pain and mucositis finally starting to resolve. tolerating some food now but still using feeding tube. no trouble breathing.  10-19-21 FU: no longer using feeding tube, no more pain  11-15-21 FU: sometimes has some burning when drinking otherwise doing well  1-4-22 FU: no new complaints  3-1-22 FU: no new complaints  5-3-22 FU: no new complaints  8-9-22 FU: no complaints  11-29-22 FU: no issues  4-4-23 FU: no new complaints  8-28-23 FU: had lower teeth removed, has full lower dentures now, no trouble eating/drinking. denture seems to be  "seating well   12-11-23 FU:  no new complaints.   3-11-24 FU: no new complaints  7-22-24 FU: no new complaints    Physical Exam:  Vitals: /82   Pulse 67   Temp 36.6 °C (97.9 °F) (Temporal)   Resp 16   Ht 1.549 m (5' 1\")   Wt 74 kg (163 lb 2 oz)   SpO2 100%   BMI 30.82 kg/m²   GENERAL: Appears well developed, well nourished  RESPIRATION: Breathing comfortably on room air, no stridor  CV: No clubbing/cyanosis/edema in hands  EYES: EOM Intact, sclera normal  NEURO: AAOx3, Cranial nerves 2-12 intact and symmetric bilaterally  HEAD AND FACE: Skin with no masses or lesions, sinuses nontender to palpation  SALIVARY GLANDS: Parotid and submandibular glands normal bilaterally  EARS: Normal external ears, external auditory canals, and TMs to otoscopy, normal hearing to whispered voice  NOSE: External nose midline, anterior rhinoscopy is normal with limited visualization to the anterior aspect of the interior turbinates, no lesions noted  ORAL CAVITY/OROPHARYNX/LIPS: Normal mucous membranes, normal floor of mouth/tongue/OP, papular/warty appearing lesions of the left posterior lateral tongue/ RMT where posterior aspect of dentures is rubbing; stable, previously biopsied  PHARYNGEAL WALLS AND NASOPHARYNX: No masses noted  NECK/LYMPH: No LAD, no thyroid masses    Labs:  TSH/T4 (6/26/23)  TSH 0.04 (low), free T4 1.32 (normal)    Assessment and Plan:  Kalli Pandey is a 69 y.o. female who presents to ENT clinic today for cancer surveillance of her [id number] Scca of the posteriorlateral tongue.  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

## 2024-09-30 ENCOUNTER — APPOINTMENT (OUTPATIENT)
Dept: PRIMARY CARE | Facility: CLINIC | Age: 69
End: 2024-09-30
Payer: MEDICARE

## 2024-10-01 DIAGNOSIS — Z12.31 ENCOUNTER FOR SCREENING MAMMOGRAM FOR BREAST CANCER: ICD-10-CM

## 2025-01-02 ENCOUNTER — APPOINTMENT (OUTPATIENT)
Dept: PRIMARY CARE | Facility: CLINIC | Age: 70
End: 2025-01-02
Payer: MEDICARE

## 2025-01-02 ENCOUNTER — LAB (OUTPATIENT)
Dept: LAB | Facility: LAB | Age: 70
End: 2025-01-02
Payer: MEDICARE

## 2025-01-02 VITALS
OXYGEN SATURATION: 94 % | WEIGHT: 164 LBS | HEIGHT: 61 IN | DIASTOLIC BLOOD PRESSURE: 82 MMHG | HEART RATE: 71 BPM | SYSTOLIC BLOOD PRESSURE: 138 MMHG | BODY MASS INDEX: 30.96 KG/M2

## 2025-01-02 DIAGNOSIS — E55.9 VITAMIN D DEFICIENCY: ICD-10-CM

## 2025-01-02 DIAGNOSIS — E03.9 ADULT HYPOTHYROIDISM: ICD-10-CM

## 2025-01-02 DIAGNOSIS — I15.9 BENIGN SECONDARY HYPERTENSION: ICD-10-CM

## 2025-01-02 DIAGNOSIS — Z78.0 MENOPAUSE: ICD-10-CM

## 2025-01-02 DIAGNOSIS — Z00.00 ROUTINE GENERAL MEDICAL EXAMINATION AT HEALTH CARE FACILITY: Primary | ICD-10-CM

## 2025-01-02 PROBLEM — L50.0 ALLERGIC URTICARIA: Status: RESOLVED | Noted: 2023-06-26 | Resolved: 2025-01-02

## 2025-01-02 PROBLEM — E86.1 ABSOLUTE HYPOVOLEMIA: Status: RESOLVED | Noted: 2024-06-13 | Resolved: 2025-01-02

## 2025-01-02 PROBLEM — L25.5 CONTACT DERMATITIS DUE TO PLANT: Status: RESOLVED | Noted: 2023-06-26 | Resolved: 2025-01-02

## 2025-01-02 PROBLEM — I95.9 HYPOTENSION: Status: RESOLVED | Noted: 2024-06-13 | Resolved: 2025-01-02

## 2025-01-02 PROBLEM — M72.2 PLANTAR FASCIITIS: Status: RESOLVED | Noted: 2023-06-26 | Resolved: 2025-01-02

## 2025-01-02 PROBLEM — R09.02 HYPOXIA: Status: RESOLVED | Noted: 2024-06-13 | Resolved: 2025-01-02

## 2025-01-02 PROBLEM — J98.11 PULMONARY ATELECTASIS: Status: RESOLVED | Noted: 2024-06-13 | Resolved: 2025-01-02

## 2025-01-02 LAB
25(OH)D3 SERPL-MCNC: 15 NG/ML (ref 30–100)
ANION GAP SERPL CALC-SCNC: 11 MMOL/L (ref 10–20)
BUN SERPL-MCNC: 16 MG/DL (ref 6–23)
CALCIUM SERPL-MCNC: 10 MG/DL (ref 8.6–10.3)
CHLORIDE SERPL-SCNC: 100 MMOL/L (ref 98–107)
CO2 SERPL-SCNC: 32 MMOL/L (ref 21–32)
CREAT SERPL-MCNC: 0.78 MG/DL (ref 0.5–1.05)
EGFRCR SERPLBLD CKD-EPI 2021: 83 ML/MIN/1.73M*2
ERYTHROCYTE [DISTWIDTH] IN BLOOD BY AUTOMATED COUNT: 12.9 % (ref 11.5–14.5)
GLUCOSE SERPL-MCNC: 80 MG/DL (ref 74–99)
HCT VFR BLD AUTO: 43.2 % (ref 36–46)
HGB BLD-MCNC: 13.9 G/DL (ref 12–16)
MCH RBC QN AUTO: 27.4 PG (ref 26–34)
MCHC RBC AUTO-ENTMCNC: 32.2 G/DL (ref 32–36)
MCV RBC AUTO: 85 FL (ref 80–100)
NRBC BLD-RTO: 0 /100 WBCS (ref 0–0)
PLATELET # BLD AUTO: 225 X10*3/UL (ref 150–450)
POTASSIUM SERPL-SCNC: 3.7 MMOL/L (ref 3.5–5.3)
RBC # BLD AUTO: 5.07 X10*6/UL (ref 4–5.2)
SODIUM SERPL-SCNC: 139 MMOL/L (ref 136–145)
T4 FREE SERPL-MCNC: 1.19 NG/DL (ref 0.61–1.12)
TSH SERPL-ACNC: 0.03 MIU/L (ref 0.44–3.98)
WBC # BLD AUTO: 6.3 X10*3/UL (ref 4.4–11.3)

## 2025-01-02 PROCEDURE — G0439 PPPS, SUBSEQ VISIT: HCPCS | Performed by: FAMILY MEDICINE

## 2025-01-02 PROCEDURE — 85027 COMPLETE CBC AUTOMATED: CPT

## 2025-01-02 PROCEDURE — 1036F TOBACCO NON-USER: CPT | Performed by: FAMILY MEDICINE

## 2025-01-02 PROCEDURE — 1159F MED LIST DOCD IN RCRD: CPT | Performed by: FAMILY MEDICINE

## 2025-01-02 PROCEDURE — 3079F DIAST BP 80-89 MM HG: CPT | Performed by: FAMILY MEDICINE

## 2025-01-02 PROCEDURE — 80048 BASIC METABOLIC PNL TOTAL CA: CPT

## 2025-01-02 PROCEDURE — 1160F RVW MEDS BY RX/DR IN RCRD: CPT | Performed by: FAMILY MEDICINE

## 2025-01-02 PROCEDURE — 1158F ADVNC CARE PLAN TLK DOCD: CPT | Performed by: FAMILY MEDICINE

## 2025-01-02 PROCEDURE — 84443 ASSAY THYROID STIM HORMONE: CPT

## 2025-01-02 PROCEDURE — 1170F FXNL STATUS ASSESSED: CPT | Performed by: FAMILY MEDICINE

## 2025-01-02 PROCEDURE — 3075F SYST BP GE 130 - 139MM HG: CPT | Performed by: FAMILY MEDICINE

## 2025-01-02 PROCEDURE — 82306 VITAMIN D 25 HYDROXY: CPT

## 2025-01-02 PROCEDURE — 3008F BODY MASS INDEX DOCD: CPT | Performed by: FAMILY MEDICINE

## 2025-01-02 PROCEDURE — 1123F ACP DISCUSS/DSCN MKR DOCD: CPT | Performed by: FAMILY MEDICINE

## 2025-01-02 PROCEDURE — 99214 OFFICE O/P EST MOD 30 MIN: CPT | Performed by: FAMILY MEDICINE

## 2025-01-02 PROCEDURE — 84439 ASSAY OF FREE THYROXINE: CPT

## 2025-01-02 RX ORDER — ERGOCALCIFEROL 1.25 MG/1
50000 CAPSULE ORAL WEEKLY
Qty: 12 CAPSULE | Refills: 3 | Status: SHIPPED | OUTPATIENT
Start: 2025-01-02 | End: 2026-01-02

## 2025-01-02 ASSESSMENT — ACTIVITIES OF DAILY LIVING (ADL)
DOING_HOUSEWORK: INDEPENDENT
BATHING: INDEPENDENT
GROCERY_SHOPPING: INDEPENDENT
DRESSING: INDEPENDENT
TAKING_MEDICATION: INDEPENDENT
MANAGING_FINANCES: INDEPENDENT

## 2025-01-02 ASSESSMENT — PATIENT HEALTH QUESTIONNAIRE - PHQ9
2. FEELING DOWN, DEPRESSED OR HOPELESS: NOT AT ALL
SUM OF ALL RESPONSES TO PHQ9 QUESTIONS 1 AND 2: 0
1. LITTLE INTEREST OR PLEASURE IN DOING THINGS: NOT AT ALL

## 2025-01-02 NOTE — PROGRESS NOTES
"Subjective   Reason for Visit: Kalli Pandey is an 69 y.o. female here for a Medicare Wellness visit.     Past Medical, Surgical, and Family History reviewed and updated in chart.    Reviewed all medications by prescribing practitioner or clinical pharmacist (such as prescriptions, OTCs, herbal therapies and supplements) and documented in the medical record.    HPI  HPOA and living will no  Code DNR     H/o tongue cancer 2021 never smoke   Dr Marion every 6 months no scan for 2 years   No changes      Vit d def    H/o orif ankle     Not on vit d     Never had dexa     Mammogram ordered but needs scheduled     Home BP lower than 130/80     Hypothyroid dx 2004     Prevous EKG suggestive of MI but no heart disease     Patient Care Team:  Chucky Artis DO as PCP - General (Family Medicine)     Review of Systems    Objective   Vitals:  /82 (BP Location: Right arm, Patient Position: Sitting)   Pulse 71   Ht 1.556 m (5' 1.25\")   Wt 74.4 kg (164 lb)   SpO2 94%   BMI 30.74 kg/m²       Physical Exam  Vitals reviewed.   Constitutional:       Appearance: Normal appearance.   HENT:      Head: Normocephalic and atraumatic.   Eyes:      Conjunctiva/sclera: Conjunctivae normal.   Neck:      Comments: Has radical neck dissection scarring on the left  Cardiovascular:      Rate and Rhythm: Normal rate and regular rhythm.   Pulmonary:      Effort: Pulmonary effort is normal.      Breath sounds: Normal breath sounds.   Musculoskeletal:      Cervical back: Neck supple. No tenderness.   Skin:     General: Skin is warm and dry.   Neurological:      General: No focal deficit present.      Mental Status: She is alert and oriented to person, place, and time.   Psychiatric:         Mood and Affect: Mood normal.         Behavior: Behavior normal.         Thought Content: Thought content normal.         Judgment: Judgment normal.         Assessment & Plan  Routine general medical examination at health care facility    Orders:    1 " Year Follow Up In Primary Care - Wellness Exam; Future    Vitamin D deficiency    Orders:    Vitamin D 25-Hydroxy,Total (for eval of Vitamin D levels); Future    ergocalciferol (Vitamin D-2) 1.25 MG (52970 UT) capsule; Take 1 capsule (50,000 Units) by mouth 1 (one) time per week.    Vitamin D 25-Hydroxy,Total (for eval of Vitamin D levels); Future    Benign secondary hypertension    Orders:    Basic Metabolic Panel; Future    CBC; Future    Lipid Panel; Future    Adult hypothyroidism    Orders:    TSH with reflex to Free T4 if abnormal; Future    Basic Metabolic Panel; Future    CBC; Future    TSH with reflex to Free T4 if abnormal; Future    Menopause    Orders:    XR DEXA bone density; Future

## 2025-01-02 NOTE — ASSESSMENT & PLAN NOTE
Orders:    Vitamin D 25-Hydroxy,Total (for eval of Vitamin D levels); Future    ergocalciferol (Vitamin D-2) 1.25 MG (51153 UT) capsule; Take 1 capsule (50,000 Units) by mouth 1 (one) time per week.    Vitamin D 25-Hydroxy,Total (for eval of Vitamin D levels); Future

## 2025-01-02 NOTE — ASSESSMENT & PLAN NOTE
Orders:    TSH with reflex to Free T4 if abnormal; Future    Basic Metabolic Panel; Future    CBC; Future    TSH with reflex to Free T4 if abnormal; Future

## 2025-01-03 ENCOUNTER — TELEPHONE (OUTPATIENT)
Dept: PRIMARY CARE | Facility: CLINIC | Age: 70
End: 2025-01-03

## 2025-01-03 DIAGNOSIS — E03.9 ADULT HYPOTHYROIDISM: ICD-10-CM

## 2025-01-03 RX ORDER — LEVOTHYROXINE SODIUM 88 UG/1
88 TABLET ORAL DAILY
Qty: 90 TABLET | Refills: 3 | Status: SHIPPED | OUTPATIENT
Start: 2025-01-03 | End: 2026-01-03

## 2025-01-13 ENCOUNTER — APPOINTMENT (OUTPATIENT)
Dept: OTOLARYNGOLOGY | Facility: CLINIC | Age: 70
End: 2025-01-13
Payer: MEDICARE

## 2025-01-13 VITALS
TEMPERATURE: 97.7 F | WEIGHT: 165.4 LBS | HEART RATE: 78 BPM | SYSTOLIC BLOOD PRESSURE: 137 MMHG | HEIGHT: 61 IN | OXYGEN SATURATION: 99 % | DIASTOLIC BLOOD PRESSURE: 81 MMHG | RESPIRATION RATE: 18 BRPM | BODY MASS INDEX: 31.23 KG/M2

## 2025-01-13 DIAGNOSIS — Z85.810 HISTORY OF TONGUE CANCER: Primary | ICD-10-CM

## 2025-01-13 PROCEDURE — 3079F DIAST BP 80-89 MM HG: CPT | Performed by: OTOLARYNGOLOGY

## 2025-01-13 PROCEDURE — 99212 OFFICE O/P EST SF 10 MIN: CPT | Performed by: OTOLARYNGOLOGY

## 2025-01-13 PROCEDURE — 1159F MED LIST DOCD IN RCRD: CPT | Performed by: OTOLARYNGOLOGY

## 2025-01-13 PROCEDURE — 1036F TOBACCO NON-USER: CPT | Performed by: OTOLARYNGOLOGY

## 2025-01-13 PROCEDURE — 1126F AMNT PAIN NOTED NONE PRSNT: CPT | Performed by: OTOLARYNGOLOGY

## 2025-01-13 PROCEDURE — 3008F BODY MASS INDEX DOCD: CPT | Performed by: OTOLARYNGOLOGY

## 2025-01-13 PROCEDURE — 3075F SYST BP GE 130 - 139MM HG: CPT | Performed by: OTOLARYNGOLOGY

## 2025-01-13 PROCEDURE — 1160F RVW MEDS BY RX/DR IN RCRD: CPT | Performed by: OTOLARYNGOLOGY

## 2025-01-13 ASSESSMENT — COLUMBIA-SUICIDE SEVERITY RATING SCALE - C-SSRS
2. HAVE YOU ACTUALLY HAD ANY THOUGHTS OF KILLING YOURSELF?: NO
6. HAVE YOU EVER DONE ANYTHING, STARTED TO DO ANYTHING, OR PREPARED TO DO ANYTHING TO END YOUR LIFE?: NO
1. IN THE PAST MONTH, HAVE YOU WISHED YOU WERE DEAD OR WISHED YOU COULD GO TO SLEEP AND NOT WAKE UP?: NO

## 2025-01-13 ASSESSMENT — ENCOUNTER SYMPTOMS
OCCASIONAL FEELINGS OF UNSTEADINESS: 0
DEPRESSION: 0
LOSS OF SENSATION IN FEET: 0

## 2025-01-13 ASSESSMENT — PAIN SCALES - GENERAL: PAINLEVEL_OUTOF10: 0-NO PAIN

## 2025-01-13 NOTE — PROGRESS NOTES
Otolaryngology Head and Neck Surgery Clinic Follow-up Note    CC: tongue cancer    Subjective:  Kalli Pandey is a 69 y.o. female who presents to clinic today for follow up of her [id number] left posterolateral tongue scca.     path - 3 cm, 16 mm DOI, negative margin, closest at 8 mm deep; 2 lymph nodes pos without ECS  completed adjuvant radiation.        66 yo woman with 3 month history of sore on the left side of the tongue. She first noticed it back in February. There's some discomfort there and she has to avoid it while eating. No bleeding, doesn't report gaviota pain. Seems to be persistent / enlarged since then. She never recalled a non healing ulcer or sore in that spot. denies smoking or drinking. No heart problems, no blood thinners.     5-12-21 FU: had CT scan, PAT today; no new complaints  5-19-21: OR s/p triple, trach, left hemiglossectomy, left neck dissection 1-4, recon with left radial forearm free flap.  6-8-21 FU: doing well at home, breathing comfortably, occasional blood tinged mucous and scabs with suctioning. minimal pain  6-22-21 FU: doing well with capped trach during day, feels a little tight at night. tolerating tube feeds, no shortness of breath, no mucous plugs, no significant pain, tongue swelling improved  7-13-21 fu: has had trach capped for past 2 weeks, no issues. to start radiation tomorrow with Dr. Pitt  9-14-21 FU: completed radiation 8/26. pain and mucositis finally starting to resolve. tolerating some food now but still using feeding tube. no trouble breathing.  10-19-21 FU: no longer using feeding tube, no more pain  11-15-21 FU: sometimes has some burning when drinking otherwise doing well  1-4-22 FU: no new complaints  3-1-22 FU: no new complaints  5-3-22 FU: no new complaints  8-9-22 FU: no complaints  11-29-22 FU: no issues  4-4-23 FU: no new complaints  8-28-23 FU: had lower teeth removed, has full lower dentures now, no trouble eating/drinking. denture seems to be  "seating well   12-11-23 FU:  no new complaints.   3-11-24 FU: no new complaints  7-22-24 FU: no new complaints  1-13-25 FU: no new complaints    Physical Exam:  Vitals: /81 (BP Location: Right arm, Patient Position: Sitting, BP Cuff Size: Adult)   Pulse 78   Temp 36.5 °C (97.7 °F) (Temporal)   Resp 18   Ht 1.549 m (5' 1\")   Wt 75 kg (165 lb 6.4 oz)   SpO2 99%   BMI 31.25 kg/m²   GENERAL: Appears well developed, well nourished  RESPIRATION: Breathing comfortably on room air, no stridor  CV: No clubbing/cyanosis/edema in hands  EYES: EOM Intact, sclera normal  NEURO: AAOx3, Cranial nerves 2-12 intact and symmetric bilaterally  HEAD AND FACE: Skin with no masses or lesions, sinuses nontender to palpation  SALIVARY GLANDS: Parotid and submandibular glands normal bilaterally  EARS: Normal external ears, external auditory canals, and TMs to otoscopy, normal hearing to whispered voice  NOSE: External nose midline, anterior rhinoscopy is normal with limited visualization to the anterior aspect of the interior turbinates, no lesions noted  ORAL CAVITY/OROPHARYNX/LIPS: Normal mucous membranes, normal floor of mouth/tongue/OP, papular/warty appearing lesions of the left posterior lateral tongue/ RMT where posterior aspect of dentures is rubbing; stable, previously biopsied  PHARYNGEAL WALLS AND NASOPHARYNX: No masses noted  NECK/LYMPH: No LAD, no thyroid masses    Labs:  TSH/T4 (6/26/23)  TSH 0.04 (low), free T4 1.32 (normal)    Assessment and Plan:  Kalli Pandey is a 69 y.o. female who presents to ENT clinic today for cancer surveillance of her [id number] Scca of the posteriorlateral tongue.  Chest xray 3-24 WNL  TSH 6-24: 0.04, followed by pcp, on synthroid  -pt doing well after a hemiglossectomy, free flap, radiation  -she has had this papillary appearance to the right posterior flap junction with mandible for a year now. Biopsy neg and stable in appearance, it is likely from chronic rubbing with the " dentures.  -follow up in 4 months with one of my partners, Dr Collins.

## 2025-01-20 ENCOUNTER — APPOINTMENT (OUTPATIENT)
Dept: OTOLARYNGOLOGY | Facility: CLINIC | Age: 70
End: 2025-01-20
Payer: MEDICARE

## 2025-01-22 ENCOUNTER — APPOINTMENT (OUTPATIENT)
Dept: RADIOLOGY | Facility: CLINIC | Age: 70
End: 2025-01-22
Payer: MEDICARE

## 2025-02-03 ENCOUNTER — APPOINTMENT (OUTPATIENT)
Dept: RADIOLOGY | Facility: CLINIC | Age: 70
End: 2025-02-03
Payer: MEDICARE

## 2025-02-05 ENCOUNTER — HOSPITAL ENCOUNTER (OUTPATIENT)
Dept: RADIOLOGY | Facility: CLINIC | Age: 70
Discharge: HOME | End: 2025-02-05
Payer: MEDICARE

## 2025-02-05 VITALS — WEIGHT: 165.39 LBS | BODY MASS INDEX: 31.23 KG/M2 | HEIGHT: 61 IN

## 2025-02-05 DIAGNOSIS — Z78.0 MENOPAUSE: ICD-10-CM

## 2025-02-05 DIAGNOSIS — Z12.31 ENCOUNTER FOR SCREENING MAMMOGRAM FOR BREAST CANCER: ICD-10-CM

## 2025-02-05 PROCEDURE — 77067 SCR MAMMO BI INCL CAD: CPT | Performed by: RADIOLOGY

## 2025-02-05 PROCEDURE — 77080 DXA BONE DENSITY AXIAL: CPT | Performed by: RADIOLOGY

## 2025-02-05 PROCEDURE — 77080 DXA BONE DENSITY AXIAL: CPT

## 2025-02-05 PROCEDURE — 77063 BREAST TOMOSYNTHESIS BI: CPT | Performed by: RADIOLOGY

## 2025-02-05 PROCEDURE — 77067 SCR MAMMO BI INCL CAD: CPT

## 2025-05-12 ENCOUNTER — OFFICE VISIT (OUTPATIENT)
Dept: OTOLARYNGOLOGY | Facility: CLINIC | Age: 70
End: 2025-05-12
Payer: MEDICARE

## 2025-05-12 VITALS — TEMPERATURE: 96.6 F | BODY MASS INDEX: 30.87 KG/M2 | WEIGHT: 163.5 LBS | HEIGHT: 61 IN

## 2025-05-12 DIAGNOSIS — Z85.810 HISTORY OF TONGUE CANCER: Primary | ICD-10-CM

## 2025-05-12 PROCEDURE — 99213 OFFICE O/P EST LOW 20 MIN: CPT | Performed by: STUDENT IN AN ORGANIZED HEALTH CARE EDUCATION/TRAINING PROGRAM

## 2025-05-12 PROCEDURE — 1126F AMNT PAIN NOTED NONE PRSNT: CPT | Performed by: STUDENT IN AN ORGANIZED HEALTH CARE EDUCATION/TRAINING PROGRAM

## 2025-05-12 PROCEDURE — 3008F BODY MASS INDEX DOCD: CPT | Performed by: STUDENT IN AN ORGANIZED HEALTH CARE EDUCATION/TRAINING PROGRAM

## 2025-05-12 PROCEDURE — 1159F MED LIST DOCD IN RCRD: CPT | Performed by: STUDENT IN AN ORGANIZED HEALTH CARE EDUCATION/TRAINING PROGRAM

## 2025-05-12 ASSESSMENT — PATIENT HEALTH QUESTIONNAIRE - PHQ9
SUM OF ALL RESPONSES TO PHQ9 QUESTIONS 1 AND 2: 0
1. LITTLE INTEREST OR PLEASURE IN DOING THINGS: NOT AT ALL
2. FEELING DOWN, DEPRESSED OR HOPELESS: NOT AT ALL

## 2025-05-12 ASSESSMENT — PAIN SCALES - GENERAL: PAINLEVEL_OUTOF10: 0-NO PAIN

## 2025-05-12 NOTE — PROGRESS NOTES
"[facility] Follow-up Note    CC: tongue cancer    Subjective:  Kalli Pandey is a 69 y.o. female who presents to clinic today for follow up of her [id number] left posterolateral tongue scca.     Treatment History  - 5/19/21 left hemiglossectomy, neck dissection, RFFF reconstruction ([facility]), final pathology 3 cm, 16 mm DOI, negative margin, closest at 8 mm deep; 2 lymph nodes pos without ECS  - 8/26/21 completed adjuvant radiation.    - 1/25/22 post treatment CT neck with expected treatment changes, no concerning findings  - 12/11/23 left posterior tongue biopsy, ulceration and granulation    Last seen by Dr Marion in January 2025, at that time was doing well. Returns today for routine surveillance. Denies pain, bleeding, new lumps or bumps in her mouth. Had lower teeth removed, upper teeth causing issues. Has not seen dentist since hers retired. No weight loss, 163 lbs today. Never smoker    Physical Exam:  Vitals: Temp 35.9 °C (96.6 °F)   Ht 1.549 m (5' 1\")   Wt 74.2 kg (163 lb 8 oz)   BMI 30.89 kg/m²   GENERAL: Appears well developed, well nourished  RESPIRATION: Breathing comfortably on room air, no stridor  CV: No clubbing/cyanosis/edema in hands  EYES: EOM Intact, sclera normal  NEURO: AAOx3, Cranial nerves 2-12 intact and symmetric bilaterally  HEAD AND FACE: Skin with no masses or lesions, sinuses nontender to palpation  SALIVARY GLANDS: Parotid and submandibular glands normal bilaterally  EARS: Normal external ears, external auditory canals, and TMs to otoscopy, normal hearing to whispered voice  NOSE: External nose midline, anterior rhinoscopy is normal with limited visualization to the anterior aspect of the interior turbinates, no lesions noted  ORAL CAVITY/OROPHARYNX/LIPS: Normal mucous membranes, normal floor of mouth/tongue/OP, papular granulomatous posterior lateral tongue/ RMT where posterior aspect of dentures is rubbing; stable, previously biopsied " (negative)  PHARYNGEAL WALLS AND NASOPHARYNX: No masses noted  NECK/LYMPH: No LAD, no thyroid masses    Labs:  TSH/T4 (6/26/23)  TSH 0.04 (low), free T4 1.32 (normal)    Assessment and Plan:  Kalli Pandey is a 69 y.o. female who presents to ENT clinic today for cancer surveillance of her [id number] Scca of the posteriorlateral tongue, now almost 3.5 years out from  completion of treatment.    - Doing well overall, LEONA today. 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 Will continue to observe, I suspect this is where her lower denture is rubbing  - continue synthroid as prescribed, follows with PCP for this  - Will hold off on further imaging unless new concerns develop  - Follow up 6 months, certainly sooner if any new concerns    Caroltrey Collins MD

## 2025-06-09 ENCOUNTER — TELEPHONE (OUTPATIENT)
Dept: PRIMARY CARE | Facility: CLINIC | Age: 70
End: 2025-06-09
Payer: MEDICARE

## 2025-06-09 DIAGNOSIS — I15.9 BENIGN SECONDARY HYPERTENSION: ICD-10-CM

## 2025-06-09 RX ORDER — VALSARTAN AND HYDROCHLOROTHIAZIDE 160; 12.5 MG/1; MG/1
1 TABLET, FILM COATED ORAL DAILY
Qty: 90 TABLET | Refills: 3 | Status: SHIPPED | OUTPATIENT
Start: 2025-06-09

## 2025-08-21 ENCOUNTER — OFFICE VISIT (OUTPATIENT)
Dept: URGENT CARE | Facility: CLINIC | Age: 70
End: 2025-08-21
Payer: MEDICARE

## 2025-08-21 VITALS
TEMPERATURE: 97.8 F | HEART RATE: 61 BPM | OXYGEN SATURATION: 98 % | SYSTOLIC BLOOD PRESSURE: 143 MMHG | BODY MASS INDEX: 30.78 KG/M2 | HEIGHT: 61 IN | DIASTOLIC BLOOD PRESSURE: 81 MMHG | WEIGHT: 163 LBS

## 2025-08-21 DIAGNOSIS — L25.5 CONTACT DERMATITIS DUE TO PLANTS, EXCEPT FOOD, UNSPECIFIED CONTACT DERMATITIS TYPE: Primary | ICD-10-CM

## 2025-08-21 PROCEDURE — 99213 OFFICE O/P EST LOW 20 MIN: CPT | Performed by: NURSE PRACTITIONER

## 2025-08-21 RX ORDER — PREDNISONE 10 MG/1
TABLET ORAL
Qty: 21 TABLET | Refills: 0 | Status: SHIPPED | OUTPATIENT
Start: 2025-08-21

## 2025-08-21 RX ORDER — MUPIROCIN 20 MG/G
1 OINTMENT TOPICAL
Qty: 30 G | Refills: 0 | Status: SHIPPED | OUTPATIENT
Start: 2025-08-21 | End: 2025-08-31

## 2025-11-24 ENCOUNTER — APPOINTMENT (OUTPATIENT)
Dept: OTOLARYNGOLOGY | Facility: CLINIC | Age: 70
End: 2025-11-24
Payer: MEDICARE

## 2026-01-05 ENCOUNTER — APPOINTMENT (OUTPATIENT)
Dept: PRIMARY CARE | Facility: CLINIC | Age: 71
End: 2026-01-05
Payer: MEDICARE